# Patient Record
Sex: MALE | Race: WHITE | HISPANIC OR LATINO | URBAN - METROPOLITAN AREA
[De-identification: names, ages, dates, MRNs, and addresses within clinical notes are randomized per-mention and may not be internally consistent; named-entity substitution may affect disease eponyms.]

---

## 2023-02-27 ENCOUNTER — HOSPITAL ENCOUNTER (OUTPATIENT)
Facility: MEDICAL CENTER | Age: 53
End: 2023-02-27
Attending: PHYSICIAN ASSISTANT
Payer: COMMERCIAL

## 2023-02-27 ENCOUNTER — OFFICE VISIT (OUTPATIENT)
Dept: MEDICAL GROUP | Facility: PHYSICIAN GROUP | Age: 53
End: 2023-02-27
Payer: COMMERCIAL

## 2023-02-27 VITALS
WEIGHT: 153 LBS | HEIGHT: 61 IN | SYSTOLIC BLOOD PRESSURE: 136 MMHG | HEART RATE: 77 BPM | BODY MASS INDEX: 28.89 KG/M2 | OXYGEN SATURATION: 94 % | RESPIRATION RATE: 12 BRPM | DIASTOLIC BLOOD PRESSURE: 80 MMHG | TEMPERATURE: 97.4 F

## 2023-02-27 DIAGNOSIS — R30.0 DYSURIA: ICD-10-CM

## 2023-02-27 DIAGNOSIS — N50.82 SCROTAL PAIN: ICD-10-CM

## 2023-02-27 LAB
APPEARANCE UR: CLEAR
BILIRUB UR STRIP-MCNC: NEGATIVE MG/DL
COLOR UR AUTO: YELLOW
GLUCOSE UR STRIP.AUTO-MCNC: NEGATIVE MG/DL
KETONES UR STRIP.AUTO-MCNC: NEGATIVE MG/DL
LEUKOCYTE ESTERASE UR QL STRIP.AUTO: NEGATIVE
NITRITE UR QL STRIP.AUTO: NORMAL
PH UR STRIP.AUTO: 5.5 [PH] (ref 5–8)
PROT UR QL STRIP: NEGATIVE MG/DL
RBC UR QL AUTO: NEGATIVE
SP GR UR STRIP.AUTO: 1.01
UROBILINOGEN UR STRIP-MCNC: NORMAL MG/DL

## 2023-02-27 PROCEDURE — 87491 CHLMYD TRACH DNA AMP PROBE: CPT

## 2023-02-27 PROCEDURE — 99203 OFFICE O/P NEW LOW 30 MIN: CPT | Performed by: PHYSICIAN ASSISTANT

## 2023-02-27 PROCEDURE — 81002 URINALYSIS NONAUTO W/O SCOPE: CPT | Performed by: PHYSICIAN ASSISTANT

## 2023-02-27 PROCEDURE — 87591 N.GONORRHOEAE DNA AMP PROB: CPT

## 2023-02-27 RX ORDER — TAMSULOSIN HYDROCHLORIDE 0.4 MG/1
CAPSULE ORAL
COMMUNITY
Start: 2023-01-03 | End: 2023-03-13

## 2023-02-27 RX ORDER — FINASTERIDE 5 MG/1
5 TABLET, FILM COATED ORAL DAILY
COMMUNITY
Start: 2023-01-03 | End: 2023-03-13

## 2023-02-27 ASSESSMENT — PATIENT HEALTH QUESTIONNAIRE - PHQ9: CLINICAL INTERPRETATION OF PHQ2 SCORE: 0

## 2023-02-28 DIAGNOSIS — R30.0 DYSURIA: ICD-10-CM

## 2023-02-28 NOTE — PROGRESS NOTES
CC:    Chief Complaint   Patient presents with    [facility]     Referral to urology   [id number]        HISTORY OF THE PRESENT ILLNESS: Patient is a 52 y.o. male presenting to [facility] primary care.  used    Pt had colonoscopy done in [place]  Pt having discomfort in his scrotum and shaft for past 5 weeks. Possible some dysuria. No discharge. No rashes.    No problem-specific Assessment & Plan notes found for this encounter.    Allergies: Patient has no allergy information on record.    Current Outpatient Medications Ordered in Epic   Medication Sig Dispense Refill    NON SPECIFIED Indications: y- age patches      Saw Palmetto, Serenoa repens, (MENS FORMULA PO) Take  by mouth.      VITAMIN E PO Take  by mouth.      NON SPECIFIED Indications: P-X traditional Urinary      NON SPECIFIED Indications: PS II male glandula support      Multiple Vitamins-Minerals (ZINC PO) Take  by mouth.      finasteride (PROSCAR) 5 MG Tab Take 5 mg by mouth every day. (Patient not taking: Reported on 2/27/2023)      tamsulosin (FLOMAX) 0.4 MG capsule TAKE 1 CAPSULE BY MOUTH ONCE DAILY 1/ 2 HOUR FOLLOWING THE SAME MEAL EACH DAY (Patient not taking: Reported on 2/27/2023)       No current [facility]-ordered facility-administered medications on file.       No past medical history on file.    No past surgical history on file.    Social History     Tobacco Use    Smoking status: Never    Smokeless tobacco: Never   Vaping Use    Vaping Use: Never used   Substance Use Topics    Alcohol use: Yes     Comment: occ    Drug use: Never       Social History     Social History Narrative    Not on file       No family history on file.    ROS:     - Constitutional: Negative for fever, chills, unexpected weight change, and fatigue/generalized weakness.     - HEENT: Negative for headaches, vision changes, hearing changes, ear pain, ear discharge, rhinorrhea, sinus congestion, sore throat, and neck pain.      - Respiratory: Negative for  "cough, sputum production, chest congestion, dyspnea, wheezing, and crackles.      - Cardiovascular: Negative for chest pain, palpitations, orthopnea, and bilateral lower extremity edema.     - Gastrointestinal: Negative for heartburn, nausea, vomiting, abdominal pain, hematochezia, melena, diarrhea, constipation, and greasy/foul-smelling stools.     - Genitourinary:Positive for scrotal pain. Negative for dysuria, polyuria, hematuria, pyuria, urinary urgency, and urinary incontinence.    .      Exam: /80   Pulse 77   Temp 36.3 °C (97.4 °F) (Temporal)   Resp 12   Ht 1.56 m (5' 1.42\")   Wt 69.4 kg (153 lb)   SpO2 94%  Body mass index is 28.52 kg/m².    General: Normal appearing. No acute distress.  Skin: Warm and dry.  No obvious lesions.  HEENT: Normocephalic. Eyes conjunctiva clear lids without ptosis, ears normal shape and contour  : no lesions or masses noted  Neurologic: Grossly nonfocal, A&O x3, gait normal,  Musculoskeletal: No deformity or swelling.   Extremities: No extremity cyanosis, clubbing, or edema.  Psych: Normal mood and affect. Judgment and insight is normal.    Please note that this dictation was created using voice recognition software. I have made every reasonable attempt to correct obvious errors, but I expect that there are errors of grammar and possibly content that I did not discover before finalizing the note.      Assessment/Plan  1. Dysuria  UA normal will check for STDs  - POCT Urinalysis  - Chlamydia/GC, PCR (Urine); Future    2. Scrotal pain  Will get US and review in 2 weeks  - [id number]; Future    "

## 2023-03-01 LAB
C TRACH DNA SPEC QL NAA+PROBE: NEGATIVE
N GONORRHOEA DNA SPEC QL NAA+PROBE: NEGATIVE
SPECIMEN SOURCE: NORMAL

## 2023-03-13 ENCOUNTER — OFFICE VISIT (OUTPATIENT)
Dept: MEDICAL GROUP | Facility: PHYSICIAN GROUP | Age: 53
End: 2023-03-13
Payer: COMMERCIAL

## 2023-03-13 VITALS
WEIGHT: 153 LBS | DIASTOLIC BLOOD PRESSURE: 70 MMHG | SYSTOLIC BLOOD PRESSURE: 122 MMHG | TEMPERATURE: 96.6 F | OXYGEN SATURATION: 96 % | RESPIRATION RATE: 12 BRPM | HEART RATE: 72 BPM | HEIGHT: 61 IN | BODY MASS INDEX: 28.89 KG/M2

## 2023-03-13 DIAGNOSIS — N43.40 SPERMATOCELE: ICD-10-CM

## 2023-03-13 DIAGNOSIS — Z00.00 PHYSICAL EXAM, ANNUAL: ICD-10-CM

## 2023-03-13 PROCEDURE — 99396 PREV VISIT EST AGE 40-64: CPT | Performed by: PHYSICIAN ASSISTANT

## 2023-03-13 NOTE — PROGRESS NOTES
CC:    Chief Complaint   Patient presents with    Follow-Up     US results        HISTORY OF THE PRESENT ILLNESS:  52 y.o. male presenting for annual physical exam. Language line  used.  Pt had scrotal US done indicating he has a L spermatocele. His discomfort has remained unchanged.        No problem-specific Assessment & Plan notes found for this encounter.    Allergies: Patient has no known allergies.    Current Outpatient Medications Ordered in Epic   Medication Sig Dispense Refill    NON SPECIFIED Indications: y- age patches      Saw Palmetto, Serenoa repens, (MENS FORMULA PO) Take  by mouth.      VITAMIN E PO Take  by mouth.      NON SPECIFIED Indications: P-X traditional Urinary      NON SPECIFIED Indications: PS II male glandula support      Multiple Vitamins-Minerals (ZINC PO) Take  by mouth.      finasteride (PROSCAR) 5 MG Tab Take 5 mg by mouth every day. (Patient not taking: Reported on 2/27/2023)      tamsulosin (FLOMAX) 0.4 MG capsule TAKE 1 CAPSULE BY MOUTH ONCE DAILY 1/ 2 HOUR FOLLOWING THE SAME MEAL EACH DAY (Patient not taking: Reported on 2/27/2023)       No current [facility]-ordered facility-administered medications on file.       No past medical history on file.    No past surgical history on file.    Social History     Tobacco Use    Smoking status: Never    Smokeless tobacco: Never   Vaping Use    Vaping Use: Never used   Substance Use Topics    Alcohol use: Yes     Comment: occ    Drug use: Never       Social History     Social History Narrative    Not on file       No family history on file.    ROS:     - Constitutional: Negative for fever, chills, unexpected weight change, and fatigue/generalized weakness.     - HEENT: Negative for headaches, vision changes, hearing changes, ear pain, ear discharge, rhinorrhea, sinus congestion, sore throat, and neck pain.      - Respiratory: Negative for cough, sputum production, chest congestion, dyspnea, wheezing, and crackles.      -  "Cardiovascular: Negative for chest pain, palpitations, orthopnea, and bilateral lower extremity edema.     - Gastrointestinal: Negative for heartburn, nausea, vomiting, abdominal pain, hematochezia, melena, diarrhea, constipation, and greasy/foul-smelling stools.     - Genitourinary: Negative for dysuria, polyuria, hematuria, pyuria, urinary urgency, and urinary incontinence.     - Musculoskeletal: Negative for myalgias, back pain, and joint pain.     - Skin: Negative for rash, itching, cyanotic skin color change.     - Neurological: Negative for dizziness, tingling, tremors, focal sensory deficit, focal weakness and headaches.     - Endo/Heme/Allergies: Does not bruise/bleed easily.     - Psychiatric/Behavioral: Negative for depression, suicidal/homicidal ideation and memory loss.          Health Maintenance  Cholesterol Screening: Fasting lipid panel  Diabetes Screening: Fasting blood sugar  Exercise: Regular exercise.   Substance Abuse: None  Safe in relationship Yes     Cancer screening  Colorectal Cancer Screening: done in [place]         Exam: /70   Pulse 72   Temp 35.9 °C (96.6 °F) (Temporal)   Resp 12   Ht 1.549 m (5' 1\")   Wt 69.4 kg (153 lb)   SpO2 96%  Body mass index is 28.91 kg/m².    General: Normal appearing. No distress.  HEENT: Normocephalic. Eyes conjunctiva clear lids without ptosis, pupils equal and reactive to light accommodation, ears normal shape and contour, canals are clear bilaterally, tympanic membranes are benign, nasal mucosa benign, oropharynx is without erythema, edema or exudates.   Neck: Supple without JVD or bruit. No thyromegaly. No cervical lymphadenopathy  Pulmonary: Clear to ausculation.  Normal effort. No rales, ronchi, or wheezing.  Cardiovascular: Regular rate and rhythm without murmur, gallops or rubs  Neurologic: Grossly nonfocal, gait normal, normal muscle tone  Skin: Warm and dry.  No obvious lesions.  Musculoskeletal: No extremity cyanosis, clubbing, or edema. " No deformity  Psych: Normal mood and affect. Alert and oriented x3. Judgment and insight is normal.    Please note that this dictation was created using voice recognition software. I have made every reasonable attempt to correct obvious errors, but I expect that there are errors of grammar and possibly content that I did not discover before finalizing the note.      Assessment/Plan  1. Physical exam, annual  PE conducted.    2. Spermatocele  Reassurance. Advised I can refer to urology if he would like. He would like to wait for now due to financial reasons.

## 2023-03-24 ENCOUNTER — OFFICE VISIT (OUTPATIENT)
Dept: URGENT CARE | Facility: CLINIC | Age: 53
End: 2023-03-24
Payer: COMMERCIAL

## 2023-03-24 VITALS
DIASTOLIC BLOOD PRESSURE: 72 MMHG | RESPIRATION RATE: 14 BRPM | HEIGHT: 61 IN | TEMPERATURE: 97.5 F | BODY MASS INDEX: 28.89 KG/M2 | SYSTOLIC BLOOD PRESSURE: 114 MMHG | OXYGEN SATURATION: 95 % | WEIGHT: 153 LBS | HEART RATE: 68 BPM

## 2023-03-24 DIAGNOSIS — K04.7 DENTAL ABSCESS: ICD-10-CM

## 2023-03-24 PROCEDURE — 99213 OFFICE O/P EST LOW 20 MIN: CPT | Performed by: PHYSICIAN ASSISTANT

## 2023-03-24 RX ORDER — NAPROXEN 500 MG/1
500 TABLET ORAL 2 TIMES DAILY WITH MEALS
Qty: 30 TABLET | Refills: 0 | Status: SHIPPED | OUTPATIENT
Start: 2023-03-24 | End: 2023-10-30 | Stop reason: SDUPTHER

## 2023-03-24 RX ORDER — AMOXICILLIN 500 MG/1
500 CAPSULE ORAL 3 TIMES DAILY
Qty: 21 CAPSULE | Refills: 0 | Status: SHIPPED | OUTPATIENT
Start: 2023-03-24 | End: 2023-03-31

## 2023-03-24 ASSESSMENT — ENCOUNTER SYMPTOMS
CONSTIPATION: 0
SORE THROAT: 0
ABDOMINAL PAIN: 0
EYE REDNESS: 0
COUGH: 0
SHORTNESS OF BREATH: 0
NAUSEA: 0
CHILLS: 0
SINUS PAIN: 0
HEADACHES: 0
WHEEZING: 0
DIARRHEA: 0
EYE DISCHARGE: 0
DIZZINESS: 0
FEVER: 0
EYE PAIN: 0
VOMITING: 0
DIAPHORESIS: 0

## 2023-03-24 NOTE — PROGRESS NOTES
"  Subjective:     Alcides Painter  is a 52 y.o. male who presents for Oral Swelling (R side upper side of mouth, pt states had a dental procedure 2 months ago, infection concern )       Presents today for suspected dental infection of the right upper tooth.  States that he had a dental procedure 2 months ago where a tooth was removed and was doing well after the procedure until development of his current symptoms within the last few days.  Is having localized pain present over the tooth.  Has not contacted his dentist in regards to this problem.  No fever/chill/sweats, no chest pain or shortness breath, no nausea vomiting, no abdominal pain, no diarrhea.     Review of Systems   Constitutional:  Negative for chills, diaphoresis, fever and malaise/fatigue.   HENT:  Negative for congestion, ear discharge, sinus pain and sore throat.         Right upper dental pain   Eyes:  Negative for pain, discharge and redness.   Respiratory:  Negative for cough, shortness of breath and wheezing.    Cardiovascular:  Negative for chest pain.   Gastrointestinal:  Negative for abdominal pain, constipation, diarrhea, nausea and vomiting.   Genitourinary:  Negative for dysuria, frequency and urgency.   Neurological:  Negative for dizziness and headaches.    No Known Allergies  History reviewed. No pertinent past medical history.     Objective:   /72 (BP Location: Left arm, Patient Position: Sitting, BP Cuff Size: Adult)   Pulse 68   Temp 36.4 °C (97.5 °F) (Temporal)   Resp 14   Ht 1.549 m (5' 1\")   Wt 69.4 kg (153 lb)   SpO2 95%   BMI 28.91 kg/m²   Physical Exam  Vitals and nursing note reviewed.   Constitutional:       General: He is not in acute distress.     Appearance: He is not ill-appearing or toxic-appearing.   HENT:      Head: Normocephalic.      Nose: No rhinorrhea.      Mouth/Throat:      Comments: Internal examination of the mouth does reveal a dental abscess present over the right upper molar teeth.  " Localized swelling present over this affected region, no buccal swelling.  No soft tissue swelling of the sublingual mucosa, no swelling of the soft or hard palate, no unilarteral oral pharynx swelling, no uvular deviation.  Eyes:      General: No scleral icterus.     Conjunctiva/sclera: Conjunctivae normal.   Pulmonary:      Effort: Pulmonary effort is normal. No respiratory distress.      Breath sounds: No stridor.   Musculoskeletal:      Cervical back: Neck supple.   Neurological:      Mental Status: He is alert and oriented to person, place, and time.   Psychiatric:         Mood and Affect: Mood normal.         Behavior: Behavior normal.         Thought Content: Thought content normal.         Judgment: Judgment normal.           Diagnostic testing: None    Assessment/Plan:     Encounter Diagnoses   Name Primary?    Dental abscess           Plan for care for today's complaint includes amoxicillin for dental abscess, naproxen for pain.  Discussed with the patient need to contact his dentist to schedule a follow-up appointment in regards to dental abscess for further evaluation and treatment..  Prescription for amoxicillin, naproxen provided.    See AVS Instructions below for written guidance provided to patient on after-visit management and care in addition to our verbal discussion during the visit.    Please note that this dictation was created using voice recognition software. I have attempted to correct all errors, but there may be sound-alike, spelling, grammar and possibly content errors that I did not discover before finalizing the note.    Cristofer Cr PA-C

## 2023-03-27 ENCOUNTER — OFFICE VISIT (OUTPATIENT)
Dept: MEDICAL GROUP | Facility: PHYSICIAN GROUP | Age: 53
End: 2023-03-27
Payer: COMMERCIAL

## 2023-03-27 VITALS
SYSTOLIC BLOOD PRESSURE: 136 MMHG | OXYGEN SATURATION: 95 % | BODY MASS INDEX: 28.85 KG/M2 | WEIGHT: 152.8 LBS | RESPIRATION RATE: 12 BRPM | HEIGHT: 61 IN | DIASTOLIC BLOOD PRESSURE: 70 MMHG | HEART RATE: 81 BPM | TEMPERATURE: 97 F

## 2023-03-27 DIAGNOSIS — K04.7 DENTAL INFECTION: ICD-10-CM

## 2023-03-27 PROCEDURE — 99213 OFFICE O/P EST LOW 20 MIN: CPT | Performed by: PHYSICIAN ASSISTANT

## 2023-03-27 RX ORDER — PENICILLIN V POTASSIUM 500 MG/1
500 TABLET ORAL 3 TIMES DAILY
Qty: 30 TABLET | Refills: 0 | Status: SHIPPED | OUTPATIENT
Start: 2023-03-27 | End: 2023-04-06

## 2023-03-27 ASSESSMENT — ENCOUNTER SYMPTOMS
FEVER: 0
SHORTNESS OF BREATH: 0
CHILLS: 0

## 2023-03-27 NOTE — PROGRESS NOTES
"CC:  Chief Complaint   Patient presents with    Follow-Up     [facility] dental infection (R), states having dental work in [place] 3 months ago        HISTORY OF PRESENT ILLNESS: Patient is a 52 y.o. male established patient presenting with issues below  Pt had 2 teeth removed in [place] about 3 months ago. Recently had infection and seen in walk in clinic last week. Prescribed amoxicillin.     Current Outpatient Medications   Medication Sig Dispense Refill    amoxicillin (AMOXIL) 500 MG Cap Take 1 Capsule by mouth 3 times a day for 7 days. 21 Capsule 0    naproxen (NAPROSYN) 500 MG Tab Take 1 Tablet by mouth 2 times a day with meals. 30 Tablet 0     No current facility-administered medications for this visit.        ROS:     Review of Systems   Constitutional:  Negative for chills and fever.   Respiratory:  Negative for shortness of breath.    Cardiovascular:  Negative for chest pain.     Exam:    /70   Pulse 81   Temp 36.1 °C (97 °F) (Temporal)   Resp 12   Ht 1.549 m (5' 1\")   Wt 69.3 kg (152 lb 12.8 oz)   SpO2 95%  Body mass index is 28.87 kg/m².    General:  Well nourished, well developed male in NAD  Head is grossly normal.  Neck: Supple.   Skin: Warm and dry. No obvious lesions  Neuro: Normal muscle tone. Gait normal. Alert and oriented.  Psych: Normal mood and affect      Please note that this dictation was created using voice recognition software. I have made every reasonable attempt to correct obvious errors, but I expect that there are errors of grammar and possibly content that I did not discover before finalizing the note.        Assessment/Plan:  1. Dental infection  Finish amoxicillin and keep PCN on hand in case infection flares again. I advised that he needs to be seen by dentist.   - penicillin v potassium (VEETID) 500 MG Tab; Take 1 Tablet by mouth 3 times a day for 10 days.  Dispense: 30 Tablet; Refill: 0             "

## 2023-08-09 ENCOUNTER — OFFICE VISIT (OUTPATIENT)
Dept: MEDICAL GROUP | Facility: PHYSICIAN GROUP | Age: 53
End: 2023-08-09
Payer: COMMERCIAL

## 2023-08-09 ENCOUNTER — HOSPITAL ENCOUNTER (OUTPATIENT)
Facility: MEDICAL CENTER | Age: 53
End: 2023-08-09
Attending: PHYSICIAN ASSISTANT
Payer: COMMERCIAL

## 2023-08-09 VITALS
BODY MASS INDEX: 28.02 KG/M2 | RESPIRATION RATE: 14 BRPM | HEIGHT: 61 IN | OXYGEN SATURATION: 95 % | DIASTOLIC BLOOD PRESSURE: 72 MMHG | TEMPERATURE: 97.6 F | WEIGHT: 148.4 LBS | HEART RATE: 69 BPM | SYSTOLIC BLOOD PRESSURE: 120 MMHG

## 2023-08-09 DIAGNOSIS — R30.0 DYSURIA: ICD-10-CM

## 2023-08-09 DIAGNOSIS — M54.50 ACUTE LEFT-SIDED LOW BACK PAIN WITHOUT SCIATICA: ICD-10-CM

## 2023-08-09 LAB
APPEARANCE UR: CLEAR
BILIRUB UR STRIP-MCNC: NORMAL MG/DL
COLOR UR AUTO: YELLOW
GLUCOSE UR STRIP.AUTO-MCNC: NORMAL MG/DL
KETONES UR STRIP.AUTO-MCNC: NORMAL MG/DL
LEUKOCYTE ESTERASE UR QL STRIP.AUTO: NORMAL
NITRITE UR QL STRIP.AUTO: NORMAL
PH UR STRIP.AUTO: 5.5 [PH] (ref 5–8)
PROT UR QL STRIP: NORMAL MG/DL
RBC UR QL AUTO: NORMAL
SP GR UR STRIP.AUTO: >=1.03
UROBILINOGEN UR STRIP-MCNC: 0.2 MG/DL

## 2023-08-09 PROCEDURE — 87086 URINE CULTURE/COLONY COUNT: CPT

## 2023-08-09 PROCEDURE — 3078F DIAST BP <80 MM HG: CPT | Performed by: PHYSICIAN ASSISTANT

## 2023-08-09 PROCEDURE — 99213 OFFICE O/P EST LOW 20 MIN: CPT | Performed by: PHYSICIAN ASSISTANT

## 2023-08-09 PROCEDURE — 81002 URINALYSIS NONAUTO W/O SCOPE: CPT | Performed by: PHYSICIAN ASSISTANT

## 2023-08-09 PROCEDURE — 3074F SYST BP LT 130 MM HG: CPT | Performed by: PHYSICIAN ASSISTANT

## 2023-08-09 PROCEDURE — 87591 N.GONORRHOEAE DNA AMP PROB: CPT

## 2023-08-09 PROCEDURE — 87491 CHLMYD TRACH DNA AMP PROBE: CPT

## 2023-08-09 NOTE — PROGRESS NOTES
"CC:  Chief Complaint   Patient presents with    UTI       HISTORY OF PRESENT ILLNESS: Patient is a 53 y.o. male established patient presenting with issues below  Pt having pain in L lower back and urinary frequency with dysuria. Has been using icy hot patch. Triggered with movement. Icy hot patch helps improve slightly. Started about 3 weeks ago and has been worsening. Notes that has been having bad smelling urine.     Current Outpatient Medications   Medication Sig Dispense Refill    naproxen (NAPROSYN) 500 MG Tab Take 1 Tablet by mouth 2 times a day with meals. (Patient not taking: Reported on 8/9/2023) 30 Tablet 0     No current facility-administered medications for this visit.        ROS:     ROS    Exam:    /72 (BP Location: Left arm, Patient Position: Sitting, BP Cuff Size: Adult)   Pulse 69   Temp 36.4 °C (97.6 °F) (Temporal)   Resp 14   Ht 1.549 m (5' 1\")   Wt 67.3 kg (148 lb 6.4 oz)   SpO2 95%  Body mass index is 28.04 kg/m².    General:  Well nourished, well developed male in NAD  Head is grossly normal.  Neck: Supple.   Back: no CVA tenderness  Skin: Warm and dry. No obvious lesions  Neuro: Normal muscle tone. Gait normal. Alert and oriented.  Psych: Normal mood and affect      Please note that this dictation was created using voice recognition software. I have made every reasonable attempt to correct obvious errors, but I expect that there are errors of grammar and possibly content that I did not discover before finalizing the note.        Assessment/Plan:  1. Dysuria  UA is normal. Will get G/C and urine culture and f/u with results.   - POCT Urinalysis  - Chlamydia/GC, PCR (Urine); Future  - URINE CULTURE(NEW); Future    2. Acute left-sided low back pain without sciatica  Reassurance. Likely muscular etiology             "

## 2023-08-12 LAB
BACTERIA UR CULT: NORMAL
SIGNIFICANT IND 70042: NORMAL
SITE SITE: NORMAL
SOURCE SOURCE: NORMAL

## 2023-08-14 ENCOUNTER — TELEPHONE (OUTPATIENT)
Dept: MEDICAL GROUP | Facility: PHYSICIAN GROUP | Age: 53
End: 2023-08-14
Payer: COMMERCIAL

## 2023-08-14 NOTE — TELEPHONE ENCOUNTER
1. Caller Name: Alcides                        Call Back Number: [phone]      How would the patient prefer to be contacted with a response: Phone call do NOT leave a detailed message    Patient came into the office to know about the urine results and if there was anything in the urine and is wanting MRI.. Patient is Faroese speaking. Patient will be waiting in the lobby for any answers.

## 2023-08-15 ENCOUNTER — OFFICE VISIT (OUTPATIENT)
Dept: MEDICAL GROUP | Facility: PHYSICIAN GROUP | Age: 53
End: 2023-08-15
Payer: COMMERCIAL

## 2023-08-15 VITALS
HEART RATE: 73 BPM | WEIGHT: 148 LBS | DIASTOLIC BLOOD PRESSURE: 80 MMHG | HEIGHT: 61 IN | RESPIRATION RATE: 14 BRPM | SYSTOLIC BLOOD PRESSURE: 122 MMHG | OXYGEN SATURATION: 95 % | BODY MASS INDEX: 27.94 KG/M2 | TEMPERATURE: 97.1 F

## 2023-08-15 DIAGNOSIS — M54.50 ACUTE LEFT-SIDED LOW BACK PAIN WITHOUT SCIATICA: ICD-10-CM

## 2023-08-15 PROCEDURE — 3079F DIAST BP 80-89 MM HG: CPT | Performed by: PHYSICIAN ASSISTANT

## 2023-08-15 PROCEDURE — 3074F SYST BP LT 130 MM HG: CPT | Performed by: PHYSICIAN ASSISTANT

## 2023-08-15 PROCEDURE — 99213 OFFICE O/P EST LOW 20 MIN: CPT | Performed by: PHYSICIAN ASSISTANT

## 2023-08-15 NOTE — PROGRESS NOTES
"CC:  Chief Complaint   Patient presents with    Pain    Referral Needed     imaging       HISTORY OF PRESENT ILLNESS: Patient is a 53 y.o. male established patient presenting with issues below. Note: he is poor historian. Frisian interpreting service used during visit.   Pt seen last week with L lower back pain and questionable dysuria. His UA, urine culture, and gonorrhea/chlamydia all returned normal. Reports today also that L shoulder over his scapula and L upper chest are tender. Wanting to have imaging of his kidneys done.     Current Outpatient Medications   Medication Sig Dispense Refill    naproxen (NAPROSYN) 500 MG Tab Take 1 Tablet by mouth 2 times a day with meals. (Patient not taking: Reported on 8/9/2023) 30 Tablet 0     No current facility-administered medications for this visit.        ROS:     ROS    Exam:    /80 (BP Location: Left arm, Patient Position: Sitting, BP Cuff Size: Adult)   Pulse 73   Temp 36.2 °C (97.1 °F) (Temporal)   Resp 14   Ht 1.549 m (5' 1\")   Wt 67.1 kg (148 lb)   SpO2 95%  Body mass index is 27.96 kg/m².    General:  Well nourished, well developed male in NAD  Head is grossly normal.  Neck: Supple.   Pulmonary: Clear to auscultation. No ronchi, wheezing or rales  Cardiac: Regular rate and rhythm. No murmurs.  Musculoskeletal: positive for mild tenderness over L shoulder and chest, no CVA tenderness, no lowe back tenderness.  Skin: Warm and dry. No obvious lesions  Neuro: Normal muscle tone. Gait normal. Alert and oriented.  Psych: Normal mood and affect      Please note that this dictation was created using voice recognition software. I have made every reasonable attempt to correct obvious errors, but I expect that there are errors of grammar and possibly content that I did not discover before finalizing the note.        Assessment/Plan:  1. Acute left-sided low back pain without sciatica  I explained that his complaints are pointing to muscular etiology and that he " needs to rest. I can refer to physical therapy if not resolved in a few weeks.

## 2023-10-30 ENCOUNTER — OFFICE VISIT (OUTPATIENT)
Dept: MEDICAL GROUP | Facility: PHYSICIAN GROUP | Age: 53
End: 2023-10-30
Payer: COMMERCIAL

## 2023-10-30 VITALS
WEIGHT: 150 LBS | HEART RATE: 84 BPM | RESPIRATION RATE: 12 BRPM | TEMPERATURE: 97.8 F | DIASTOLIC BLOOD PRESSURE: 72 MMHG | BODY MASS INDEX: 28.32 KG/M2 | HEIGHT: 61 IN | SYSTOLIC BLOOD PRESSURE: 130 MMHG | OXYGEN SATURATION: 97 %

## 2023-10-30 DIAGNOSIS — K04.7 DENTAL ABSCESS: ICD-10-CM

## 2023-10-30 DIAGNOSIS — Z23 NEED FOR VACCINATION: ICD-10-CM

## 2023-10-30 DIAGNOSIS — M79.644 PAIN OF FINGER OF RIGHT HAND: ICD-10-CM

## 2023-10-30 PROCEDURE — 99213 OFFICE O/P EST LOW 20 MIN: CPT | Mod: 25 | Performed by: PHYSICIAN ASSISTANT

## 2023-10-30 PROCEDURE — 90471 IMMUNIZATION ADMIN: CPT | Performed by: PHYSICIAN ASSISTANT

## 2023-10-30 PROCEDURE — 3078F DIAST BP <80 MM HG: CPT | Performed by: PHYSICIAN ASSISTANT

## 2023-10-30 PROCEDURE — 3075F SYST BP GE 130 - 139MM HG: CPT | Performed by: PHYSICIAN ASSISTANT

## 2023-10-30 PROCEDURE — 90715 TDAP VACCINE 7 YRS/> IM: CPT | Performed by: PHYSICIAN ASSISTANT

## 2023-10-30 RX ORDER — NAPROXEN 500 MG/1
500 TABLET ORAL 2 TIMES DAILY WITH MEALS
Qty: 30 TABLET | Refills: 0 | Status: SHIPPED | OUTPATIENT
Start: 2023-10-30

## 2023-10-30 NOTE — PROGRESS NOTES
"CC:  Chief Complaint   Patient presents with    Finger Injury       HISTORY OF PRESENT ILLNESS: Patient is a 53 y.o. male established patient presenting with issues below. Nepali interpreting service used during visit  Several weeks ago patient was hammering a piece of metal and hit his right index finger and caused a laceration.  The laceration eventually healed in about a week.  Last week he started having some mild swelling and discomfort in this finger.    Current Outpatient Medications   Medication Sig Dispense Refill    naproxen (NAPROSYN) 500 MG Tab Take 1 Tablet by mouth 2 times a day with meals. (Patient not taking: Reported on 8/9/2023) 30 Tablet 0     No current facility-administered medications for this visit.        ROS:     ROS    Exam:    /72   Pulse 84   Temp 36.6 °C (97.8 °F) (Temporal)   Resp 12   Ht 1.549 m (5' 1\")   Wt 68 kg (150 lb)   SpO2 97%  Body mass index is 28.34 kg/m².    General:  Well nourished, well developed male in NAD  Head is grossly normal.  Neck: Supple.   Musculoskeletal: Right index finger has well-healed scar over distal lateral index finger.  No erythema or warmth but there is mild swelling of the entire finger.  Unable to fully flex finger.  Skin: Warm and dry. No obvious lesions  Neuro: Normal muscle tone. Gait normal. Alert and oriented.  Psych: Normal mood and affect      Please note that this dictation was created using voice recognition software. I have made every reasonable attempt to correct obvious errors, but I expect that there are errors of grammar and possibly content that I did not discover before finalizing the note.        Assessment/Plan:    1. Pain of finger of right hand  Advised there is no evidence of infection. Advised that he should rest his finger and ice it. R  - DX-FINGER(S) 2+ RIGHT; Future  - naproxen (NAPROSYN) 500 MG Tab; Take 1 Tablet by mouth 2 times a day with meals.  Dispense: 30 Tablet; Refill: 0    2. Need for vaccination    - " Tdap Vaccine =>8YO IM

## 2024-03-25 ENCOUNTER — OFFICE VISIT (OUTPATIENT)
Dept: MEDICAL GROUP | Facility: PHYSICIAN GROUP | Age: 54
End: 2024-03-25
Payer: COMMERCIAL

## 2024-03-25 VITALS
RESPIRATION RATE: 14 BRPM | WEIGHT: 148 LBS | DIASTOLIC BLOOD PRESSURE: 74 MMHG | HEART RATE: 69 BPM | OXYGEN SATURATION: 94 % | HEIGHT: 61 IN | BODY MASS INDEX: 27.94 KG/M2 | TEMPERATURE: 97 F | SYSTOLIC BLOOD PRESSURE: 120 MMHG

## 2024-03-25 DIAGNOSIS — Z00.00 PHYSICAL EXAM, ANNUAL: ICD-10-CM

## 2024-03-25 DIAGNOSIS — G62.89 OTHER POLYNEUROPATHY: ICD-10-CM

## 2024-03-25 DIAGNOSIS — G89.29 CHRONIC MIDLINE LOW BACK PAIN WITHOUT SCIATICA: ICD-10-CM

## 2024-03-25 DIAGNOSIS — Z11.3 SCREEN FOR STD (SEXUALLY TRANSMITTED DISEASE): ICD-10-CM

## 2024-03-25 DIAGNOSIS — B35.1 ONYCHOMYCOSIS: ICD-10-CM

## 2024-03-25 DIAGNOSIS — Z11.59 ENCOUNTER FOR HEPATITIS C SCREENING TEST FOR LOW RISK PATIENT: ICD-10-CM

## 2024-03-25 DIAGNOSIS — M54.50 CHRONIC MIDLINE LOW BACK PAIN WITHOUT SCIATICA: ICD-10-CM

## 2024-03-25 LAB — HBA1C MFR BLD: 5.9 % (ref ?–5.8)

## 2024-03-25 PROCEDURE — 3078F DIAST BP <80 MM HG: CPT | Performed by: PHYSICIAN ASSISTANT

## 2024-03-25 PROCEDURE — 3074F SYST BP LT 130 MM HG: CPT | Performed by: PHYSICIAN ASSISTANT

## 2024-03-25 PROCEDURE — 99214 OFFICE O/P EST MOD 30 MIN: CPT | Performed by: PHYSICIAN ASSISTANT

## 2024-03-25 RX ORDER — TERBINAFINE HYDROCHLORIDE 250 MG/1
250 TABLET ORAL DAILY
Qty: 84 TABLET | Refills: 0 | Status: SHIPPED | OUTPATIENT
Start: 2024-03-25 | End: 2024-06-17

## 2024-03-25 NOTE — PROGRESS NOTES
"CC:  Chief Complaint   Patient presents with    Foot Problem     Bilateral burning x2wks        HISTORY OF PRESENT ILLNESS: Patient is a 53 y.o. male established patient presenting with issues below  Pt having pain in the plantar surfaces of his feet for past two weeks. No injury. Spends a lot of time on his feet. Pain is equal on both feet and describes as burning sensation. Happened once before about 6-7 months ago and resolved on its own. Doesn't remember how long it lasted for. Admits to chronic LBP.   Requesting to have medication for his toenail fungus.     Current Outpatient Medications   Medication Sig Dispense Refill    naproxen (NAPROSYN) 500 MG Tab Take 1 Tablet by mouth 2 times a day with meals. (Patient not taking: Reported on 3/25/2024) 30 Tablet 0     No current facility-administered medications for this visit.        ROS:     ROS    Exam:    /74   Pulse 69   Temp 36.1 °C (97 °F) (Temporal)   Resp 14   Ht 1.549 m (5' 1\")   Wt 67.1 kg (148 lb)   SpO2 94%  Body mass index is 27.96 kg/m².    General:  Well nourished, well developed male in NAD  Head is grossly normal.  Neck: Supple.   Skin: Warm and dry.  Positive for brown thickening of great toenails of bilateral feet.  Neuro: Normal muscle tone. Gait normal. Alert and oriented.  Psych: Normal mood and affect      Please note that this dictation was created using voice recognition software. I have made every reasonable attempt to correct obvious errors, but I expect that there are errors of grammar and possibly content that I did not discover before finalizing the note.        Assessment/Plan:    1. Physical exam, annual  Labs printed  - CBC WITH DIFFERENTIAL; Future  - Comp Metabolic Panel; Future  - HEMOGLOBIN A1C; Future  - Lipid Profile; Future  - TSH WITH REFLEX TO FT4; Future  - VITAMIN B12; Future    2. Other polyneuropathy  Will check labs and x-rays.  Consider lumbar induced radiculopathy  - VITAMIN B12; Future    3. Encounter for " hepatitis C screening test for low risk patient    - HEP C VIRUS ANTIBODY; Future    4. Screen for STD (sexually transmitted disease)    - HIV AG/AB COMBO ASSAY SCREENING; Future    5. Chronic midline low back pain without sciatica  Review at next visit  - DX-LUMBAR SPINE-2 OR 3 VIEWS; Future    6. Onychomycosis  Start on Lamisil 250 mg.  Monitor LFTs  - terbinafine (LAMISIL) 250 MG Tab; Take 1 Tablet by mouth every day for 84 days.  Dispense: 84 Tablet; Refill: 0

## 2024-04-01 ENCOUNTER — OFFICE VISIT (OUTPATIENT)
Dept: MEDICAL GROUP | Facility: PHYSICIAN GROUP | Age: 54
End: 2024-04-01
Payer: COMMERCIAL

## 2024-04-01 VITALS
TEMPERATURE: 96.6 F | WEIGHT: 148.8 LBS | HEART RATE: 76 BPM | DIASTOLIC BLOOD PRESSURE: 74 MMHG | HEIGHT: 61 IN | BODY MASS INDEX: 28.09 KG/M2 | SYSTOLIC BLOOD PRESSURE: 118 MMHG | RESPIRATION RATE: 16 BRPM | OXYGEN SATURATION: 94 %

## 2024-04-01 DIAGNOSIS — R73.03 PREDIABETES: ICD-10-CM

## 2024-04-01 DIAGNOSIS — Z13.5 SCREENING FOR EYE CONDITION: ICD-10-CM

## 2024-04-01 DIAGNOSIS — E53.8 VITAMIN B12 DEFICIENCY: ICD-10-CM

## 2024-04-01 PROCEDURE — 99214 OFFICE O/P EST MOD 30 MIN: CPT | Performed by: PHYSICIAN ASSISTANT

## 2024-04-01 PROCEDURE — 3078F DIAST BP <80 MM HG: CPT | Performed by: PHYSICIAN ASSISTANT

## 2024-04-01 PROCEDURE — 3074F SYST BP LT 130 MM HG: CPT | Performed by: PHYSICIAN ASSISTANT

## 2024-04-01 ASSESSMENT — PATIENT HEALTH QUESTIONNAIRE - PHQ9: CLINICAL INTERPRETATION OF PHQ2 SCORE: 0

## 2024-04-01 NOTE — PROGRESS NOTES
"CC:  Chief Complaint   Patient presents with    Lab Results       HISTORY OF PRESENT ILLNESS: Patient is a 53 y.o. male established patient presenting with issues below  Patient's recent labs significant for decreased vitamin D level in setting of neuropathy in his bilateral feet.  No anemia present  Hemoglobin A1c at 5.9%.  States that his vision has been slowly worsening with time.    Current Outpatient Medications   Medication Sig Dispense Refill    terbinafine (LAMISIL) 250 MG Tab Take 1 Tablet by mouth every day for 84 days. 84 Tablet 0    naproxen (NAPROSYN) 500 MG Tab Take 1 Tablet by mouth 2 times a day with meals. (Patient not taking: Reported on 3/25/2024) 30 Tablet 0     No current facility-administered medications for this visit.        ROS:     ROS    Exam:    /74 (BP Location: Left arm, Patient Position: Sitting, BP Cuff Size: Adult)   Pulse 76   Temp 35.9 °C (96.6 °F) (Temporal)   Resp 16   Ht 1.549 m (5' 1\")   Wt 67.5 kg (148 lb 12.8 oz)   SpO2 94%  Body mass index is 28.12 kg/m².    General:  Well nourished, well developed male in NAD  Head is grossly normal.  Neck: Supple.   Skin: Warm and dry. No obvious lesions  Neuro: Normal muscle tone. Gait normal. Alert and oriented.  Psych: Normal mood and affect      Please note that this dictation was created using voice recognition software. I have made every reasonable attempt to correct obvious errors, but I expect that there are errors of grammar and possibly content that I did not discover before finalizing the note.        Assessment/Plan:  1. Vitamin B12 deficiency  No history of gastric bypass surgeries or anything that would impair his absorption so we will start on oral vitamin B12 replacement.  - VITAMIN B12; Future  - cyanocobalamin (VITAMIN B12) 1000 MCG Tab; Take 1 Tablet by mouth every day.  Dispense: 90 Tablet; Refill: 0    2. Screening for eye condition  Referral placed  - Referral to Optometry    3. Prediabetes  Discussed " carbohydrate reduction.

## 2024-11-01 ENCOUNTER — OFFICE VISIT (OUTPATIENT)
Dept: MEDICAL GROUP | Facility: PHYSICIAN GROUP | Age: 54
End: 2024-11-01
Payer: COMMERCIAL

## 2024-11-01 VITALS
RESPIRATION RATE: 16 BRPM | DIASTOLIC BLOOD PRESSURE: 70 MMHG | SYSTOLIC BLOOD PRESSURE: 114 MMHG | TEMPERATURE: 97.2 F | HEIGHT: 61 IN | BODY MASS INDEX: 28.17 KG/M2 | WEIGHT: 149.2 LBS | HEART RATE: 71 BPM | OXYGEN SATURATION: 96 %

## 2024-11-01 DIAGNOSIS — E53.8 VITAMIN B12 DEFICIENCY: ICD-10-CM

## 2024-11-01 DIAGNOSIS — R01.1 MURMUR: ICD-10-CM

## 2024-11-01 DIAGNOSIS — R73.03 PREDIABETES: ICD-10-CM

## 2024-11-01 DIAGNOSIS — R53.83 OTHER FATIGUE: ICD-10-CM

## 2024-11-01 DIAGNOSIS — G62.9 NEUROPATHY: ICD-10-CM

## 2024-11-01 LAB
HBA1C MFR BLD: 5.3 % (ref ?–5.8)
POCT INT CON NEG: NEGATIVE
POCT INT CON POS: POSITIVE

## 2024-11-01 PROCEDURE — 3078F DIAST BP <80 MM HG: CPT | Performed by: NURSE PRACTITIONER

## 2024-11-01 PROCEDURE — 83036 HEMOGLOBIN GLYCOSYLATED A1C: CPT | Performed by: NURSE PRACTITIONER

## 2024-11-01 PROCEDURE — 3074F SYST BP LT 130 MM HG: CPT | Performed by: NURSE PRACTITIONER

## 2024-11-01 PROCEDURE — 99214 OFFICE O/P EST MOD 30 MIN: CPT | Performed by: NURSE PRACTITIONER

## 2024-11-01 ASSESSMENT — ENCOUNTER SYMPTOMS
HEADACHES: 0
VOMITING: 0
NECK PAIN: 1
ABDOMINAL PAIN: 0
MYALGIAS: 1
TINGLING: 1
POLYDIPSIA: 0
SHORTNESS OF BREATH: 0
CHILLS: 0
DIARRHEA: 0
DIZZINESS: 0
PALPITATIONS: 0
WEIGHT LOSS: 0
CONSTIPATION: 0
EYES NEGATIVE: 1
BACK PAIN: 1
NAUSEA: 1
FEVER: 0

## 2024-11-01 NOTE — PROGRESS NOTES
Verbal consent was acquired by the patient to use [employer] ambient listening note generation during this visit     CC:  Chief Complaint   Patient presents with    Nausea     Feels like vomiting every morning     Follow-Up     Feet and body have been hurting        Alcides Painter 54 y.o. male  patient presenting for     History of Present Illness  The patient is a 54-year-old male who presents today for a follow-up.    He has not been feeling very well with numbness and tingling to his feet and his body aching.  Was informed by his dentist that he might have diabetes. His last blood work was conducted in 03/2024 showed prediabetes and vitamin B12 deficiency otherwise stable. He experiences frequent urination at night, particularly when he consumes a lot of water. He also reports numbness and tingling sensations in his feet, nausea upon waking up, and occasional leg discomfort. He has ran out of his vitamin B tablets that was prescribed previously. He reports no weight loss, chills, or fevers.    He occasionally experiences upper back pain, which he attributes to prolonged standing. He does not have diarrhea but feels nauseous after meals, such as after drinking coffee this morning. He often feels lethargic, sleepy, and tired. He experiences pain after consuming spicy food but not after regular meals. He reports no mouth pain or sores. He does not experience dizziness but does have headaches.        Review of Systems   Constitutional:  Positive for malaise/fatigue. Negative for chills, fever and weight loss.   HENT: Negative.     Eyes: Negative.    Respiratory:  Negative for shortness of breath.    Cardiovascular:  Negative for chest pain and palpitations.   Gastrointestinal:  Positive for nausea. Negative for abdominal pain, constipation, diarrhea and vomiting.   Musculoskeletal:  Positive for back pain, myalgias and neck pain.   Neurological:  Positive for tingling. Negative for dizziness and headaches.  "  Endo/Heme/Allergies:  Negative for polydipsia.       /70   Pulse 71   Temp 36.2 °C (97.2 °F) (Temporal)   Resp 16   Ht 1.549 m (5' 1\")   Wt 67.7 kg (149 lb 3.2 oz)   SpO2 96% , Body mass index is 28.19 kg/m².    Physical Exam  Constitutional:       Appearance: Normal appearance. He is normal weight.   HENT:      Head: Normocephalic and atraumatic.   Cardiovascular:      Rate and Rhythm: Normal rate and regular rhythm.      Pulses: Normal pulses.      Heart sounds: Murmur heard.   Pulmonary:      Effort: Pulmonary effort is normal.      Breath sounds: Normal breath sounds.   Musculoskeletal:         General: Normal range of motion.      Cervical back: Normal range of motion and neck supple. No tenderness.   Lymphadenopathy:      Cervical: No cervical adenopathy.   Skin:     General: Skin is warm and dry.   Neurological:      Mental Status: He is alert and oriented to person, place, and time.   Psychiatric:         Mood and Affect: Mood normal.         Behavior: Behavior normal.         Thought Content: Thought content normal.         Judgment: Judgment normal.           Results  Laboratory Studies  Hemoglobin A1c was 5.9 in March.   A1c 5.3.  11/1/2024    Assessment and Plan    Assessment & Plan  1. Prediabetes.  His hemoglobin A1c was last checked in April 2024, indicating prediabetes. A fingerstick test today showed a normal result of 5.3, ruling out diabetes. He will have a follow-up hemoglobin A1c test to ensure it remains within the prediabetes range.    2. Vitamin B12 Deficiency.  He has a history of low vitamin B12, which can cause neuropathy in his feet. He has not been taking vitamin B12 supplements recently. A blood test will be ordered to check his vitamin B12 levels.    3. Heart Murmur.  A heart murmur was detected during the examination. An echocardiogram will be ordered to further investigate the detected heart murmur.       1. Prediabetes  Chronic and stable condition  - POCT  A1C  - Comp " Metabolic Panel; Future  - CBC WITH DIFFERENTIAL; Future    2. Neuropathy  Chronic exacerbated condition  - VIT B12,  FOLIC ACID  - CBC WITH DIFFERENTIAL; Future    3. Vitamin B12 deficiency  Chronic and stable condition  - VIT B12,  FOLIC ACID  - CBC WITH DIFFERENTIAL; Future    4. Other fatigue  Chronic and stable condition  - TSH+FREE T4    5. Murmur  Undiagnosed new condition uncertain prognosis  - EC-ECHOCARDIOGRAM COMPLETE W/O CONT; Future        Discussed with patient possible alternative diagnoses, patient is to take all medications as prescribed.     If symptoms persist FU w/PCP, if symptoms worsen go to emergency room.     If experiencing any side effects from prescribed medications reports to the office immediately or go to emergency room.    Reviewed indication, dosage, usage and potential adverse effects of prescribed medications.     Reviewed risks and benefits of treatment plan. Patient verbalizes understanding of all instruction and verbally agrees to plan.    Return for Follow-up with PCP pending labs and imaging.    This note was created using voice recognition software ([employer]). The accuracy of the dictation is limited by the abilities of the software. I have reviewed the note prior to signing, however some errors in grammar and context are still possible. If you have any questions related to this note please do not hesitate to contact our office.

## 2024-11-27 ENCOUNTER — OFFICE VISIT (OUTPATIENT)
Dept: MEDICAL GROUP | Facility: PHYSICIAN GROUP | Age: 54
End: 2024-11-27
Payer: COMMERCIAL

## 2024-11-27 VITALS
HEART RATE: 78 BPM | TEMPERATURE: 97.9 F | SYSTOLIC BLOOD PRESSURE: 118 MMHG | RESPIRATION RATE: 16 BRPM | BODY MASS INDEX: 28.13 KG/M2 | HEIGHT: 61 IN | DIASTOLIC BLOOD PRESSURE: 78 MMHG | WEIGHT: 149 LBS | OXYGEN SATURATION: 96 %

## 2024-11-27 DIAGNOSIS — E53.8 VITAMIN B12 DEFICIENCY: ICD-10-CM

## 2024-11-27 DIAGNOSIS — R01.1 SYSTOLIC MURMUR: ICD-10-CM

## 2024-11-27 DIAGNOSIS — Z00.00 PHYSICAL EXAM, ANNUAL: ICD-10-CM

## 2024-11-28 NOTE — PROGRESS NOTES
"CC:  Chief Complaint   Patient presents with    Lab Results     Patient coming in for lab results from 11/02/24       HISTORY OF PRESENT ILLNESS: Patient is a 54 y.o. male established patient presenting with issues below  Pt's Vit B12 levels continue to be low. After last visit with me I started him on oral replacement but he did not do repeat labs and stopped taking it after Rx was completed. He has hx of peripheral neuropathy. Refuses to do B12 injections and prefers just oral replacement.   Seen by Sonja Roman about a month ago who order echocardiogram for murmur.     Current Outpatient Medications   Medication Sig Dispense Refill    cyanocobalamin (VITAMIN B12) 1000 MCG Tab Take 1 Tablet by mouth every day. (Patient not taking: Reported on 11/27/2024) 90 Tablet 0     No current facility-administered medications for this visit.        ROS:     ROS    Exam:    /78   Pulse 78   Temp 36.6 °C (97.9 °F)   Resp 16   Ht 1.549 m (5' 1\")   Wt 67.6 kg (149 lb)   SpO2 96%  Body mass index is 28.15 kg/m².    General:  Well nourished, well developed male in NAD  Head is grossly normal.  Neck: Supple.   Pulmonary: Clear to auscultation. No ronchi, wheezing or rales  Cardiac: Regular rate and rhythm. Positive for 2/6 systolic murmur.  Skin: Warm and dry. No obvious lesions  Neuro: Normal muscle tone. Gait normal. Alert and oriented.  Psych: Normal mood and affect      Please note that this dictation was created using voice recognition software. I have made every reasonable attempt to correct obvious errors, but I expect that there are errors of grammar and possibly content that I did not discover before finalizing the note.        Assessment/Plan:    1. Physical exam, annual  Labs printed.   - Lipid Profile; Future  - HEMOGLOBIN A1C; Future    2. Vitamin B12 deficiency  Restart on oral Vit b12 and recheck labs in six months  - VITAMIN B12; Future  - cyanocobalamin (VITAMIN B12) 1000 MCG Tab; Take 1 Tablet by mouth " every day.  Dispense: 90 Tablet; Refill: 3    3. Systolic murmur